# Patient Record
Sex: FEMALE | Race: WHITE | HISPANIC OR LATINO | Employment: FULL TIME | URBAN - METROPOLITAN AREA
[De-identification: names, ages, dates, MRNs, and addresses within clinical notes are randomized per-mention and may not be internally consistent; named-entity substitution may affect disease eponyms.]

---
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
## 2022-02-14 ENCOUNTER — HOSPITAL ENCOUNTER (EMERGENCY)
Facility: MEDICAL CENTER | Age: 22
End: 2022-02-14
Attending: EMERGENCY MEDICINE
Payer: COMMERCIAL

## 2022-02-14 VITALS
DIASTOLIC BLOOD PRESSURE: 65 MMHG | RESPIRATION RATE: 16 BRPM | BODY MASS INDEX: 22.79 KG/M2 | TEMPERATURE: 97.2 F | SYSTOLIC BLOOD PRESSURE: 120 MMHG | HEART RATE: 99 BPM | WEIGHT: 153.88 LBS | OXYGEN SATURATION: 97 % | HEIGHT: 69 IN

## 2022-02-14 DIAGNOSIS — J03.90 EXUDATIVE TONSILLITIS: ICD-10-CM

## 2022-02-14 PROCEDURE — 99282 EMERGENCY DEPT VISIT SF MDM: CPT

## 2022-02-14 RX ORDER — METHYLPREDNISOLONE 4 MG/1
TABLET ORAL
Qty: 1 EACH | Refills: 0 | Status: SHIPPED | OUTPATIENT
Start: 2022-02-14 | End: 2024-02-09

## 2022-02-15 NOTE — ED PROVIDER NOTES
"ED Provider Note    CHIEF COMPLAINT  Chief Complaint   Patient presents with   • Sore Throat     Pt diagnosed with tonsilitis 1 week ago. Pt says it has gotten worse on both sides. White sores noted in throat. Painful to swallow. Pt feels feverish, nauseous, body aches.        HPI  Meche Tadeo is a 21 y.o. female who presents for evaluation of a sore throat. 10 days ago she was diagnosed with an exudative tonsillitis, tested negative for strep and mono but was treated with a course of clindamycin. She also was treated with a course of tapered steroids. She states that 3 days into the course of steroids her symptoms have fully resolved. She finished antibiotics and now over the past day or 2 symptoms have returned. Feels subjectively febrile. No vomiting. No other acute complaints    REVIEW OF SYSTEMS  Negative for rash, vomiting.    PAST MEDICAL HISTORY       SOCIAL HISTORY  Social History     Tobacco Use   • Smoking status: Never Smoker   • Smokeless tobacco: Never Used   Substance and Sexual Activity   • Alcohol use: Yes     Comment: once a week   • Drug use: Never   • Sexual activity: Not on file       SURGICAL HISTORY  patient denies any surgical history    CURRENT MEDICATIONS  I personally reviewed the medication list in the charting documentation.     ALLERGIES  Allergies   Allergen Reactions   • Cephalexin      2003-11-13;urticaria 11-03   • Imitrex [Sumatriptan]        PHYSICAL EXAM  VITAL SIGNS: /75   Pulse 74   Temp 35.9 °C (96.6 °F) (Temporal)   Resp 16   Ht 1.753 m (5' 9\")   Wt 69.8 kg (153 lb 14.1 oz)   SpO2 95%   BMI 22.72 kg/m²   Constitutional: Well appearing patient in no acute distress.  Awake and alert, not toxic nor ill in appearance.  HENT: Inspection of the posterior pharynx reveals an exudative tonsillitis symmetrically. The uvula is midline. There is no obvious asymmetry, no peritonsillar or retropharyngeal abscess appreciated. No trismus. Palpation of the anterior neck " reveals very mild left-sided anterior cervical lymphadenopathy.  Neck: Comfortable movement without any obvious restriction in the range of motion.  Eyes: Conjunctiva normal, Non-icteric.   Chest: Normal nonlabored respirations.  Skin: The exposed portions of skin reveal no obvious rash or other abnormalities.  Musculoskeletal: No obvious restriction in the range of motion in all major joints.   Neurologic: Alert, No obvious focal deficits noted.   Psychiatric: Affect normal for clinical presentation    COURSE & MEDICAL DECISION MAKING  Pertinent Labs & Imaging studies reviewed. (See chart for details)    Encounter Summary: This is a very pleasant 21 y.o. female who unfortunately required evaluation in the emergency department today with a return of her exudative tonsillitis. Initially diagnosed 10 days ago, despite a negative strep swab she was treated with clindamycin and a Medrol Dosepak. She seemed to improve, actually had her symptoms resolved, a few days into the steroid pack, only to return again over the past couple days. On exam she has a symmetric exudative tonsillitis with no indication of peritonsillar nor retropharyngeal abscess. She looks well, no trismus. Will repeat the steroid Dosepak as that seemed to help significantly but no need to repeat the course of antibiotics. I will have her follow-up with ENT. She will be discharged in stable condition with strict return instructions provided      DISPOSITION: Discharge Home      FINAL IMPRESSION  1. Exudative tonsillitis        This dictation was created using voice recognition software. The accuracy of the dictation is limited to the abilities of the software. I expect there may be some errors of grammar and possibly content. The nursing notes were reviewed and certain aspects of this information were incorporated into this note.    Electronically signed by: Tesfaye Downey M.D., 2/14/2022 7:33 PM

## 2022-02-15 NOTE — ED TRIAGE NOTES
"Chief Complaint   Patient presents with   • Sore Throat     Pt diagnosed with tonsilitis 1 week ago. Pt says it has gotten worse on both sides. White sores noted in throat. Painful to swallow. Pt feels feverish, nauseous, body aches.      Pt completed clindamycin and prednisone prescription from previous visit. Pt from [place].    /75   Pulse 74   Temp 35.9 °C (96.6 °F) (Temporal)   Resp 16   Ht 1.753 m (5' 9\")   Wt 69.8 kg (153 lb 14.1 oz)   SpO2 95%   BMI 22.72 kg/m²     "

## 2023-11-07 ENCOUNTER — HOSPITAL ENCOUNTER (EMERGENCY)
Facility: MEDICAL CENTER | Age: 23
End: 2023-11-07
Attending: STUDENT IN AN ORGANIZED HEALTH CARE EDUCATION/TRAINING PROGRAM
Payer: COMMERCIAL

## 2023-11-07 VITALS
HEIGHT: 69 IN | SYSTOLIC BLOOD PRESSURE: 105 MMHG | TEMPERATURE: 97.7 F | RESPIRATION RATE: 20 BRPM | OXYGEN SATURATION: 96 % | WEIGHT: 171.3 LBS | BODY MASS INDEX: 25.37 KG/M2 | HEART RATE: 95 BPM | DIASTOLIC BLOOD PRESSURE: 60 MMHG

## 2023-11-07 DIAGNOSIS — J01.10 ACUTE NON-RECURRENT FRONTAL SINUSITIS: ICD-10-CM

## 2023-11-07 LAB
ALBUMIN SERPL BCP-MCNC: 4.2 G/DL (ref 3.2–4.9)
ALBUMIN/GLOB SERPL: 1.4 G/DL
ALP SERPL-CCNC: 34 U/L (ref 30–99)
ALT SERPL-CCNC: 21 U/L (ref 2–50)
ANION GAP SERPL CALC-SCNC: 11 MMOL/L (ref 7–16)
APPEARANCE UR: CLEAR
AST SERPL-CCNC: 30 U/L (ref 12–45)
BASOPHILS # BLD AUTO: 0.3 % (ref 0–1.8)
BASOPHILS # BLD: 0.02 K/UL (ref 0–0.12)
BILIRUB SERPL-MCNC: 0.3 MG/DL (ref 0.1–1.5)
BILIRUB UR QL STRIP.AUTO: NEGATIVE
BUN SERPL-MCNC: 10 MG/DL (ref 8–22)
CALCIUM ALBUM COR SERPL-MCNC: 8.8 MG/DL (ref 8.5–10.5)
CALCIUM SERPL-MCNC: 9 MG/DL (ref 8.5–10.5)
CHLORIDE SERPL-SCNC: 103 MMOL/L (ref 96–112)
CO2 SERPL-SCNC: 22 MMOL/L (ref 20–33)
COLOR UR: YELLOW
CREAT SERPL-MCNC: 0.75 MG/DL (ref 0.5–1.4)
EOSINOPHIL # BLD AUTO: 0.01 K/UL (ref 0–0.51)
EOSINOPHIL NFR BLD: 0.2 % (ref 0–6.9)
ERYTHROCYTE [DISTWIDTH] IN BLOOD BY AUTOMATED COUNT: 41.6 FL (ref 35.9–50)
FLUAV RNA SPEC QL NAA+PROBE: NEGATIVE
FLUBV RNA SPEC QL NAA+PROBE: NEGATIVE
GFR SERPLBLD CREATININE-BSD FMLA CKD-EPI: 116 ML/MIN/1.73 M 2
GLOBULIN SER CALC-MCNC: 2.9 G/DL (ref 1.9–3.5)
GLUCOSE SERPL-MCNC: 106 MG/DL (ref 65–99)
GLUCOSE UR STRIP.AUTO-MCNC: NEGATIVE MG/DL
HCG UR QL: NEGATIVE
HCT VFR BLD AUTO: 40.8 % (ref 37–47)
HETEROPH AB SER QL: NEGATIVE
HGB BLD-MCNC: 13.8 G/DL (ref 12–16)
IMM GRANULOCYTES # BLD AUTO: 0.02 K/UL (ref 0–0.11)
IMM GRANULOCYTES NFR BLD AUTO: 0.3 % (ref 0–0.9)
KETONES UR STRIP.AUTO-MCNC: ABNORMAL MG/DL
LEUKOCYTE ESTERASE UR QL STRIP.AUTO: NEGATIVE
LYMPHOCYTES # BLD AUTO: 1.03 K/UL (ref 1–4.8)
LYMPHOCYTES NFR BLD: 15.9 % (ref 22–41)
MCH RBC QN AUTO: 29.3 PG (ref 27–33)
MCHC RBC AUTO-ENTMCNC: 33.8 G/DL (ref 32.2–35.5)
MCV RBC AUTO: 86.6 FL (ref 81.4–97.8)
MICRO URNS: ABNORMAL
MONOCYTES # BLD AUTO: 0.51 K/UL (ref 0–0.85)
MONOCYTES NFR BLD AUTO: 7.9 % (ref 0–13.4)
NEUTROPHILS # BLD AUTO: 4.89 K/UL (ref 1.82–7.42)
NEUTROPHILS NFR BLD: 75.4 % (ref 44–72)
NITRITE UR QL STRIP.AUTO: NEGATIVE
NRBC # BLD AUTO: 0 K/UL
NRBC BLD-RTO: 0 /100 WBC (ref 0–0.2)
PH UR STRIP.AUTO: 8 [PH] (ref 5–8)
PLATELET # BLD AUTO: 218 K/UL (ref 164–446)
PMV BLD AUTO: 9.7 FL (ref 9–12.9)
POTASSIUM SERPL-SCNC: 4 MMOL/L (ref 3.6–5.5)
PROT SERPL-MCNC: 7.1 G/DL (ref 6–8.2)
PROT UR QL STRIP: NEGATIVE MG/DL
RBC # BLD AUTO: 4.71 M/UL (ref 4.2–5.4)
RBC UR QL AUTO: NEGATIVE
RSV RNA SPEC QL NAA+PROBE: NEGATIVE
S PYO DNA SPEC NAA+PROBE: NOT DETECTED
SARS-COV-2 RNA RESP QL NAA+PROBE: NOTDETECTED
SODIUM SERPL-SCNC: 136 MMOL/L (ref 135–145)
SP GR UR STRIP.AUTO: 1.03
UROBILINOGEN UR STRIP.AUTO-MCNC: 1 MG/DL
WBC # BLD AUTO: 6.5 K/UL (ref 4.8–10.8)

## 2023-11-07 PROCEDURE — 80053 COMPREHEN METABOLIC PANEL: CPT

## 2023-11-07 PROCEDURE — 36415 COLL VENOUS BLD VENIPUNCTURE: CPT

## 2023-11-07 PROCEDURE — 700102 HCHG RX REV CODE 250 W/ 637 OVERRIDE(OP): Performed by: STUDENT IN AN ORGANIZED HEALTH CARE EDUCATION/TRAINING PROGRAM

## 2023-11-07 PROCEDURE — 0241U HCHG SARS-COV-2 COVID-19 NFCT DS RESP RNA 4 TRGT ED POC: CPT

## 2023-11-07 PROCEDURE — 86308 HETEROPHILE ANTIBODY SCREEN: CPT

## 2023-11-07 PROCEDURE — A9270 NON-COVERED ITEM OR SERVICE: HCPCS | Performed by: STUDENT IN AN ORGANIZED HEALTH CARE EDUCATION/TRAINING PROGRAM

## 2023-11-07 PROCEDURE — C9803 HOPD COVID-19 SPEC COLLECT: HCPCS

## 2023-11-07 PROCEDURE — 99284 EMERGENCY DEPT VISIT MOD MDM: CPT

## 2023-11-07 PROCEDURE — 81003 URINALYSIS AUTO W/O SCOPE: CPT

## 2023-11-07 PROCEDURE — 87651 STREP A DNA AMP PROBE: CPT

## 2023-11-07 PROCEDURE — 81025 URINE PREGNANCY TEST: CPT

## 2023-11-07 PROCEDURE — 85025 COMPLETE CBC W/AUTO DIFF WBC: CPT

## 2023-11-07 RX ORDER — OXYMETAZOLINE HYDROCHLORIDE 0.05 G/100ML
2 SPRAY NASAL 2 TIMES DAILY
Qty: 15 ML | Refills: 0 | Status: SHIPPED | OUTPATIENT
Start: 2023-11-07 | End: 2023-11-10

## 2023-11-07 RX ORDER — CLINDAMYCIN HYDROCHLORIDE 300 MG/1
300 CAPSULE ORAL 3 TIMES DAILY
Qty: 15 CAPSULE | Refills: 0 | Status: ACTIVE | OUTPATIENT
Start: 2023-11-10 | End: 2023-11-15

## 2023-11-07 RX ORDER — IBUPROFEN 600 MG/1
600 TABLET ORAL ONCE
Status: COMPLETED | OUTPATIENT
Start: 2023-11-07 | End: 2023-11-07

## 2023-11-07 RX ADMIN — IBUPROFEN 600 MG: 600 TABLET, FILM COATED ORAL at 01:51

## 2023-11-07 ASSESSMENT — PAIN DESCRIPTION - PAIN TYPE: TYPE: ACUTE PAIN

## 2023-11-07 NOTE — ED NOTES
Discharge instructions given to pt. Pt verbalized understanding. All questions have been addressed. Discharged pt in stable condition.

## 2023-11-07 NOTE — ED PROVIDER NOTES
"  ER Provider Note    Scribed for Nora Varner M.D. by Geovany Galeas. 11/7/2023   1:20 AM    Primary Care Provider: Pcp Pt States None    CHIEF COMPLAINT  Chief Complaint   Patient presents with    Fever     C/o fever started yesterday.  + headache. States she was sick with cough and fever 4 days ago. Been taking ibuprofen and tylenol with no relief.          HPI/ROS    LIMITATION TO HISTORY   Select: : None    Meche Tadeo is a 23 y.o. female who presents to the ED for evaluation of fever onset two days ago. She was sick last week with cough and runny nose but was improving. Last night she got a fever but has not been able to manage. She has been taking Tylenol, Motrin and NyQuil at the appropriate dosage but it has not alleviated her diseases. She has not been coughing but has been fatigued and had a frontal headache. Her headache is described as \"pressure\". She denies any nausea, rash, diarrhea, vomiting or dysuria. She denies any chance for STI. She denies any major medical problems, or abdominal surgeries. She denies any history of UTI.     PAST MEDICAL HISTORY  History reviewed. No pertinent past medical history.    SURGICAL HISTORY  Past Surgical History:   Procedure Laterality Date    OTHER ORTHOPEDIC SURGERY Bilateral     hip surgeries       FAMILY HISTORY  History reviewed. No pertinent family history.    SOCIAL HISTORY   reports that she has never smoked. She has never used smokeless tobacco. She reports current alcohol use. She reports that she does not use drugs.    CURRENT MEDICATIONS  Discharge Medication List as of 11/7/2023  2:51 AM        CONTINUE these medications which have NOT CHANGED    Details   methylPREDNISolone (MEDROL DOSEPAK) 4 MG Tablet Therapy Pack Use as directed, Disp-1 Each, R-0, Normal      lidocaine (XYLOCAINE) 2 % Solution Take 5 mL by mouth as needed for Throat/Mouth Pain (q6hr PRN throat pain, ok to rinse and spit or swallow)., Disp-120 mL, R-0, Normal      fluticasone " "(FLONASE) 50 MCG/ACT nasal spray Administer 2 Sprays into affected nostril(S) every day., Disp-16 g, R-0, Normal             ALLERGIES  Allergies   Allergen Reactions    Cephalexin      2003-11-13;urticaria 11-03    Imitrex [Sumatriptan]         PHYSICAL EXAM  /68   Pulse 94   Temp (!) 39.2 °C (102.5 °F) (Oral)   Resp 20   Ht 1.753 m (5' 9\")   Wt 77.7 kg (171 lb 4.8 oz)   LMP 10/30/2023   SpO2 96%   BMI 25.30 kg/m²    General: Alert, oriented female sitting on gurney. Nontoxic appearing  Head: Normocephalic atraumatic. Left greater than right submandibular lymphadenopathy. Tenderness to palpation of frontal sinuses  HENT: Erythematous tonsils, trace exudates  Eyes: Extraocular motion intact  Neck: Supple, no rigidity  Cardiovascular: Regular rate and rhythm no murmurs rubs or gallops  Respiratory: Clear to auscultation bilaterally, equal chest rise and fall, no increased work of breathing  Abdomen: Soft nontender no guarding  Musculoskeletal: Warm and well perfused, no peripheral edema  Neuro: Alert, no focal deficits  Integumentary: No wounds or rashes    DIAGNOSTIC STUDIES    Labs:   Labs Reviewed   CBC WITH DIFFERENTIAL - Abnormal; Notable for the following components:       Result Value    Neutrophils-Polys 75.40 (*)     Lymphocytes 15.90 (*)     All other components within normal limits   COMP METABOLIC PANEL - Abnormal; Notable for the following components:    Glucose 106 (*)     All other components within normal limits   URINALYSIS - Abnormal; Notable for the following components:    Ketones Trace (*)     All other components within normal limits    Narrative:     Release to patient->Immediate   GROUP A STREP BY PCR    Narrative:     Release to patient->Immediate   HETEROPHILE AB SCREEN   HCG QUALITATIVE UR    Narrative:     Release to patient->Immediate   ESTIMATED GFR   POCT COV-2, FLU A/B, RSV BY PCR   POC COV-2, FLU A/B, RSV BY PCR     All labs reviewed by me.  Left shift, trace ketones, " urinalysis not consistent with infection, pregnancy negative, COVID flu influenza negative, Monospot negative, strep negative.    INITIAL ASSESSMENT COURSE AND PLAN  Care Narrative 23-year-old female presenting with frontal headache, fever, malaise, sore/dry throat, myalgias, had a recent upper respiratory infection which had resolved.    1:20 AM - Patient was evaluated at bedside. She presents for fever and headache, fever has not been controlled with Tylenol, Motrin and Nyquil. Ordered for lab work to evaluate. The patient will be medicated with Motrin 600 mg for her symptoms. Patient verbalizes understanding and support with my plan of care.  Differential diagnoses include but not limited to: Sinusitis, strep pharyngitis, mononucleosis, UTI, COVID flu influenza    ED Observation Status? No, this patient does not meet criteria for ED Observation.     2:46 AM - Patient was reevaluated at bedside. Discussed lab results with the patient and informed them that they are reassuring. Patient will now be discharged at this time. Discussed return precautions and plan for at home care.  Prescribed a watch and wait prescription for clindamycin for presumed bacterial sinusitis, patient is well-appearing at this time, and she was also prescribed Afrin, we discussed Aurea pot with distilled water, if not improving, she can start antibiotics in a couple of days.  She verbalized understanding and agreement with this plan of care was discharged home in no acute distress.  Patient verbalizes understanding and agreement to this plan of care.             DISPOSITION AND DISCUSSIONS    Discussion of management with other [facility] or appropriate source(s): None     Escalation of care considered, and ultimately not performed: diagnostic imaging.  Considered CT maxillofacial however do not think that this would significantly .    Barriers to care at this time, including but not limited to: Patient does not have established  PCP.       DISPOSITION:  Patient will be discharged home in stable condition.    FOLLOW UP:  No follow-up provider specified.    OUTPATIENT MEDICATIONS:  Discharge Medication List as of 11/7/2023  2:51 AM        START taking these medications    Details   oxymetazoline (AFRIN) 0.05 % Solution Administer 2 Sprays into affected nostril(S) 2 times a day for 3 days., Disp-15 mL, R-0, Normal      clindamycin (CLEOCIN) 300 MG Cap Take 1 Capsule by mouth 3 times a day for 5 days., Disp-15 Capsule, R-0, Normal           FINAL DIAGNOSIS  1. Acute non-recurrent frontal sinusitis      Geovany TORRES (Blaise), am scribing for, and in the presence of, Steven Varner M.D..    Electronically signed by: Geovany Galeas (Blaise), 11/7/2023    Steven TORRES M.D. personally performed the services described in this documentation, as scribed by Geovany Galeas in my presence, and it is both accurate and complete.      The note accurately reflects work and decisions made by me.  Steven Varner M.D.  11/7/2023  5:23 AM

## 2023-11-07 NOTE — DISCHARGE INSTRUCTIONS
Try to avoid taking the antibiotics.  If your symptoms are worsening after several days of sinus rinses, Afrin, you may take the antibiotics.  Return if you are vomiting, develop severe worsening pain, vision changes, facial weakness or numbness other new symptoms you find concerning otherwise follow-up with your primary care doctor in the next 2 to 3 days for reevaluation.

## 2023-11-07 NOTE — ED TRIAGE NOTES
Meche Tadeo  23 y.o.  female  Chief Complaint   Patient presents with    Fever     C/o fever started yesterday.  + headache. States she was sick with cough and fever 4 days ago. Been taking ibuprofen and tylenol with no relief.

## 2023-12-04 ENCOUNTER — HOSPITAL ENCOUNTER (OUTPATIENT)
Facility: MEDICAL CENTER | Age: 23
End: 2023-12-04
Attending: PREVENTIVE MEDICINE
Payer: COMMERCIAL

## 2023-12-04 ENCOUNTER — EMPLOYEE HEALTH (OUTPATIENT)
Dept: OCCUPATIONAL MEDICINE | Facility: CLINIC | Age: 23
End: 2023-12-04

## 2023-12-04 ENCOUNTER — EH NON-PROVIDER (OUTPATIENT)
Dept: OCCUPATIONAL MEDICINE | Facility: CLINIC | Age: 23
End: 2023-12-04

## 2023-12-04 DIAGNOSIS — Z02.1 ENCOUNTER FOR PRE-EMPLOYMENT HEALTH SCREENING EXAMINATION: ICD-10-CM

## 2023-12-04 DIAGNOSIS — Z02.1 PRE-EMPLOYMENT DRUG SCREENING: ICD-10-CM

## 2023-12-04 LAB
AMP AMPHETAMINE: NORMAL
BAR BARBITURATES: NORMAL
BZO BENZODIAZEPINES: NORMAL
COC COCAINE: NORMAL
INT CON NEG: NORMAL
INT CON POS: NORMAL
MDMA ECSTASY: NORMAL
MET METHAMPHETAMINES: NORMAL
MTD METHADONE: NORMAL
OPI OPIATES: NORMAL
OXY OXYCODONE: NORMAL
PCP PHENCYCLIDINE: NORMAL
POC URINE DRUG SCREEN OCDRS: NEGATIVE
THC: NORMAL

## 2023-12-04 PROCEDURE — 90686 IIV4 VACC NO PRSV 0.5 ML IM: CPT | Performed by: NURSE PRACTITIONER

## 2023-12-04 PROCEDURE — 8915 PR COMPREHENSIVE PHYSICAL: Performed by: PREVENTIVE MEDICINE

## 2023-12-04 PROCEDURE — 94375 RESPIRATORY FLOW VOLUME LOOP: CPT | Performed by: PREVENTIVE MEDICINE

## 2023-12-04 PROCEDURE — 86480 TB TEST CELL IMMUN MEASURE: CPT | Performed by: PREVENTIVE MEDICINE

## 2023-12-04 PROCEDURE — 80305 DRUG TEST PRSMV DIR OPT OBS: CPT | Performed by: PREVENTIVE MEDICINE

## 2023-12-06 LAB
GAMMA INTERFERON BACKGROUND BLD IA-ACNC: 0.05 IU/ML
M TB IFN-G BLD-IMP: NEGATIVE
M TB IFN-G CD4+ BCKGRND COR BLD-ACNC: 0 IU/ML
MITOGEN IGNF BCKGRD COR BLD-ACNC: >10 IU/ML
QFT TB2 - NIL TBQ2: 0 IU/ML

## 2024-02-02 ENCOUNTER — NON-PROVIDER VISIT (OUTPATIENT)
Dept: URGENT CARE | Facility: CLINIC | Age: 24
End: 2024-02-02
Payer: COMMERCIAL

## 2024-02-02 ENCOUNTER — OCCUPATIONAL MEDICINE (OUTPATIENT)
Dept: URGENT CARE | Facility: CLINIC | Age: 24
End: 2024-02-02
Payer: COMMERCIAL

## 2024-02-02 VITALS
SYSTOLIC BLOOD PRESSURE: 102 MMHG | TEMPERATURE: 97.5 F | RESPIRATION RATE: 16 BRPM | HEIGHT: 69 IN | BODY MASS INDEX: 24.45 KG/M2 | HEART RATE: 107 BPM | DIASTOLIC BLOOD PRESSURE: 62 MMHG | OXYGEN SATURATION: 96 % | WEIGHT: 165.1 LBS

## 2024-02-02 DIAGNOSIS — S39.011A STRAIN OF MUSCLE OF RIGHT GROIN REGION: ICD-10-CM

## 2024-02-02 DIAGNOSIS — Z02.1 PRE-EMPLOYMENT DRUG SCREENING: Primary | ICD-10-CM

## 2024-02-02 DIAGNOSIS — Z02.83 ENCOUNTER FOR EMPLOYMENT-RELATED DRUG TESTING: ICD-10-CM

## 2024-02-02 DIAGNOSIS — S76.212A INGUINAL STRAIN, LEFT, INITIAL ENCOUNTER: ICD-10-CM

## 2024-02-02 LAB
AMP AMPHETAMINE: NORMAL
BAR BARBITURATES: NORMAL
BREATH ALCOHOL COMMENT: NORMAL
BZO BENZODIAZEPINES: NORMAL
COC COCAINE: NORMAL
INT CON NEG: NEGATIVE
INT CON POS: POSITIVE
MDMA ECSTASY: NORMAL
MET METHAMPHETAMINES: NORMAL
MTD METHADONE: NORMAL
OPI OPIATES: NORMAL
OXY OXYCODONE: NORMAL
PCP PHENCYCLIDINE: NORMAL
POC BREATHALIZER: 0 PERCENT (ref 0–0.01)
POC URINE DRUG SCREEN OCDRS: NORMAL
THC: NORMAL

## 2024-02-02 PROCEDURE — 99213 OFFICE O/P EST LOW 20 MIN: CPT | Performed by: FAMILY MEDICINE

## 2024-02-02 PROCEDURE — 3074F SYST BP LT 130 MM HG: CPT | Performed by: FAMILY MEDICINE

## 2024-02-02 PROCEDURE — 3078F DIAST BP <80 MM HG: CPT | Performed by: FAMILY MEDICINE

## 2024-02-02 PROCEDURE — 80305 DRUG TEST PRSMV DIR OPT OBS: CPT | Performed by: FAMILY MEDICINE

## 2024-02-02 PROCEDURE — 82075 ASSAY OF BREATH ETHANOL: CPT | Performed by: FAMILY MEDICINE

## 2024-02-02 ASSESSMENT — FIBROSIS 4 INDEX: FIB4 SCORE: 0.63

## 2024-02-02 NOTE — PROGRESS NOTES
"Subjective:     Meche Tadeo is a 23 y.o. female who presents for Hip Injury and Back Pain (WC DOI 1/30/24 hip & back injury, room 3)      Bilateral groin muscle pain after moving a patient    PMH:   No pertinent past medical history to this problem  MEDS:  Medications were reviewed in EMR  ALLERGIES:  Allergies were reviewed in EMR  SOCHX:  Works as a   FH:   No pertinent family history to this problem       Objective:     /62 (BP Location: Left arm, Patient Position: Sitting, BP Cuff Size: Adult)   Pulse (!) 107   Temp 36.4 °C (97.5 °F)   Resp 16   Ht 1.753 m (5' 9\")   Wt 74.9 kg (165 lb 1.6 oz)   SpO2 96%   BMI 24.38 kg/m²     No vertebral pain, no pain in the hip joint.  Pains in the groin muscles, pain with straight leg raise bilaterally, pain with abduction and adduction of both legs.    Assessment/Plan:       1. Strain of muscle of right groin region    2. Inguinal strain, left, initial encounter    Released to Restricted Duty FROM 2/2/2024 TO 2/9/2024     Follow up one week, no lifting, stooping or climbing    Differential diagnosis, natural history, supportive care, and indications for immediate follow-up discussed.    "

## 2024-02-02 NOTE — LETTER
[facility] [address] [address]  Phone:  [phone] - Fax:  [phone]   [facility] Progress Report and Disability Certification  Date of Service: 2/2/2024   No Show:  No  Date / Time of Next Visit: 2/9/2024 @ 3 PM    Claim Information   Patient Name: Meche Tadeo  Claim Number:     Employer: [employer]  Date of Injury: 1/30/2024     Insurer / TPA: Esis  ID / SSN:     Occupation: [employer]  Diagnosis: Diagnoses of Strain of muscle of right groin region and Inguinal strain, left, initial encounter were pertinent to this visit.    Medical Information   Related to Industrial Injury? Yes    Subjective Complaints:  Bilateral groin muscle pain after moving a patient   Objective Findings: No vertebral pain, no pain in the hip joint.  Pains in the groin muscles, pain with straight leg raise bilaterally, pain with abduction and adduction of both legs.   Pre-Existing Condition(s):     Assessment:   Initial Visit    Status: Additional Care Required  Permanent Disability:No    Plan: Medication  Comments:naprosyn bid    Diagnostics:      Comments:  Follow up one week, no lifting, stooping or climbing    Disability Information   Status: Released to Restricted Duty    From:  2/2/2024  Through: 2/9/2024 Restrictions are: Temporary   Physical Restrictions   Sitting:    Standing:    Stooping:    Bending:      Squatting:    Walking:    Climbing:    Pushing:      Pulling:    Other:    Reaching Above Shoulder (L):   Reaching Above Shoulder (R):       Reaching Below Shoulder (L):    Reaching Below Shoulder (R):      Not to exceed Weight Limits   Carrying(hrs):   Weight Limit(lb):   Lifting(hrs):   Weight  Limit(lb):     Comments:      Repetitive Actions   Hands: i.e. Fine Manipulations from Grasping:     Feet: i.e. Operating Foot Controls:     Driving / Operate Machinery:     Health Care Provider’s Original or Electronic Signature  Stiven Palafox  M.D. Health Care Provider’s Original or Electronic Signature    Otilio Frias DO MPH     Clinic Name / Location: [address]  [place] [address] Clinic Phone Number: Dept: [phone]   Appointment Time: 1:45 Pm Visit Start Time: 2:04 PM   Check-In Time:  2:01 Pm Visit Discharge Time: 2:48 PM    Original-Treating Physician or Chiropractor    Page 2-Insurer/TPA    Page 3-Employer    Page 4-Employee

## 2024-02-02 NOTE — LETTER
"    EMPLOYEE’S CLAIM FOR COMPENSATION/ REPORT OF INITIAL TREATMENT  FORM C-4  PLEASE TYPE OR PRINT    EMPLOYEE’S CLAIM - PROVIDE ALL INFORMATION REQUESTED   First Name                    DAGO Mcgregor Last Name  Carlyle Birthdate                    2000                Sex  []M  []F Claim Number (Insurer’s Use Only)     Home Address  [address] Age  23 y.o. Height  1.753 m (5' 9\") Weight  74.9 kg (165 lb 1.6 oz) Social Security Number     [facility] Zip  [address] Telephone  [phone] (home)    Mailing Address  [address] [facility] Zip  [address] Primary Language Spoken  English    INSURER   THIRD-PARTY   Esis   Employee's Occupation (Job Title) When Injury or Occupational Disease Occurred  [employer]    Employer's Name/Company Name  [employer]  Telephone  [phone]    Office Mail Address (Number and Street)  [address] [place] [address]     Date of Injury (if applicable) 1/30/2024               Hours Injury (if applicable)  5:30 PM Date Employer Notified  1/30/2024 Last Day of Work after Injury or Occupational Disease  2/1/2024 Supervisor to Whom Injury     Reported  [facility]   Address or Location of Accident (if applicable)  Work [1]   What were you doing at the time of accident? (if applicable)  traNsportiNg a patieNt by bed    How did this injury or occupational disease occur? (Be specific and answer in detail. Use additional sheet if necessary)  While traNsportiNg a patieNt by bet to a New room as I was pulliNg the bed iNto the room I stepped aNd my upper body aNd hip twisted awkwardly. I felt paiN iN my groiN area aNd oN the back side of my hip that begaN to radiate towards my back.   If you believe that you have an occupational disease, when did you first have knowledge of the disability and its relationship to your employment?  N/A Witnesses to the Accident (if " applicable)  NoNe      Nature of Injury or Occupational Disease  Workers' Compensation  Part(s) of Body Injured or Affected  Hip (R) Lower Back Area (Lumbar Area & Lumbo-Sacral) Abdomen Including Groin    I CERTIFY THAT THE ABOVE IS TRUE AND CORRECT TO T HE BEST OF MY KNOWLEDGE AND THAT I HAVE PROVIDED THIS INFORMATION IN ORDER TO OBTAIN THE BENEFITS OF [place], OR [place]).  I HEREBY AUTHORIZE ANY PHYSICIAN, CHIROPRACTOR, SURGEON, PRACTITIONER OR ANY OTHER PERSON, ANY HOSPITAL, INCLUDING [facility] OR [facility], ANY  MEDICAL SERVICE ORGANIZATION, ANY INSURANCE COMPANY, OR OTHER INSTITUTION OR ORGANIZATION TO RELEASE TO EACH OTHER, ANY MEDICAL OR OTHER INFORMATION, INCLUDING BENEFITS PAID OR PAYABLE, PERTINENT TO THIS INJURY OR DISEASE, EXCEPT INFORMATION RELATIVE TO DIAGNOSIS, TREATMENT AND/OR COUNSELING FOR AIDS, PSYCHOLOGICAL CONDITIONS, ALCOHOL OR CONTROLLED SUBSTANCES, FOR WHICH I MUST GIVE SPECIFIC AUTHORIZATION.  A PHOTOSTAT OF THIS AUTHORIZATION SHALL BE VALID AS THE ORIGINAL.     Date 02/02/2024   [facility]  Employee’s Original or  *Electronic Signature   THIS REPORT MUST BE COMPLETED AND MAILED WITHIN 3 WORKING DAYS OF TREATMENT   [facility]    Name of Facility  [facility]   Date 2/2/2024 Diagnosis and Description of Injury or Occupational Disease  (S39.011A) Strain of muscle of right groin region  (S76.212A) Inguinal strain, left, initial encounter  Diagnoses of Strain of muscle of right groin region and Inguinal strain, left, initial encounter were pertinent to this visit. Is there evidence that the injured employee was under the influence of alcohol and/or another controlled substance at the time of accident?  []No  [] Yes (if yes, please explain)   Hour 2:04 PM  No   Treatment: Rest, anti inflamatory    Have you advised the patient to remain off work five days or  more?   [] Yes Indicate dates: From   To    []No If no, is the injured employee capable of: [] full duty [] modified duty                                                             No  Yes  If modified duty, specify any limitations / restrictions:  No patient lifting or pushing, stooping                                                                                                                                                                                                                                                                                                                                                                                                               X-Ray Findings:      From information given by the employee, together with medical evidence, can you directly connect this injury or occupational disease as job incurred?  []Yes   [] No Yes    Is additional medical care by a physician indicated? []Yes [] No  Yes    Do you know of any previous injury or disease contributing to this condition or occupational disease? []Yes [] No (Explain if yes)                          Yes  Comments:Patient has had bilateral hip surgery, and has injured groin muscle with athletics in the past   Date  2/2/2024 Print Health Care Provider’s Name  Manny Palafox M.D. I certify that the employer’s copy of  this form was delivered to the employer on:   Address  [address] [place] [address] INSURER'S USE ONLY                       [facility]  [address] Provider’s Tax ID Number  [id number]   Telephone  Dept: [phone]    Health Care Provider’s Original or Electronic Signature  e-SignMANNY PALAFOX M.D. Degree (MD,DO, DC,PA-C,APRN)  MD  Choose (if applicable)      ORIGINAL - TREATING HEALTHCARE PROVIDER PAGE 2 - INSURER/TPA PAGE 3 - EMPLOYER PAGE 4 - EMPLOYEE             Form C-4 (rev.08/23)        BRIEF DESCRIPTION OF RIGHTS AND BENEFITS  (Pursuant to [place] 616C.050)    Notice of Injury or Occupational  "Disease (Incident Report Form C-1): If an injury or occupational disease (OD) arises out of and in the course of employment, you must provide written notice to your employer as soon as practicable, but no later than 7 days after the accident or OD. Your employer shall maintain a sufficient supply of the required forms.    Claim for Compensation (Form C-4): If medical treatment is sought, the form C-4 is available at the place of initial treatment. A completed \"Claim for Compensation\" (Form C-4) must be filed within 90 days after an accident or OD. The treating physician or chiropractor must, within 3 working days after treatment, complete and mail to the employer, the employer's insurer and third-party , the Claim for Compensation.    Medical Treatment: If you require medical treatment for your on-the-job injury or OD, you may be required to select a physician or chiropractor from a list provided by your workers’ compensation insurer, if it has contracted with an Organization for Managed Care (MCO) or Preferred Provider Organization (PPO) or providers of health care. If your employer has not entered into a contract with an MCO or PPO, you may select a physician or chiropractor from the Panel of Physicians and Chiropractors. Any medical costs related to your industrial injury or OD will be paid by your insurer.    Temporary Total Disability (TTD): If your doctor has certified that you are unable to work for a period of at least 5 consecutive days, or 5 cumulative days in a 20-day period, or places restrictions on you that your employer does not accommodate, you may be entitled to TTD compensation.    Temporary Partial Disability (TPD): If the wage you receive upon reemployment is less than the compensation for TTD to which you are entitled, the insurer may be required to pay you TPD compensation to make up the difference. TPD can only be paid for a maximum of 24 months.    Permanent Partial Disability " (PPD): When your medical condition is stable and there is an indication of a PPD as a result of your injury or OD, within 30 days, your insurer must arrange for an evaluation by a rating physician or chiropractor to determine the degree of your PPD. The amount of your PPD award depends on the date of injury, the results of the PPD evaluation, your age and wage.    Permanent Total Disability (PTD): If you are medically certified by a treating physician or chiropractor as permanently and totally disabled and have been granted a PTD status by your insurer, you are entitled to receive monthly benefits not to exceed 66 2/3% of your average monthly wage. The amount of your PTD payments is subject to reduction if you previously received a lump-sum PPD award.    Vocational Rehabilitation Services: You may be eligible for vocational rehabilitation services if you are unable to return to the job due to a permanent physical impairment or permanent restrictions as a result of your injury or occupational disease.    Transportation and Per Sherrell Reimbursement: You may be eligible for travel expenses and per sherrell associated with medical treatment.    Reopening: You may be able to reopen your claim if your condition worsens after claim closure.     Appeal Process: If you disagree with a written determination issued by the insurer or the insurer does not respond to your request, you may appeal to the Department of Administration, , by following the instructions contained in your determination letter. You must appeal the determination within 70 days from the date of the determination letter at [address], [place] [address], or [address], [place] [address]. If you disagree with the  decision, you may appeal to the Department of Administration, . You must file your appeal within 30 days from the date of the  decision  letter at [address], [place], [place] [address], or [address], [address], [place], [place] [address]. If you disagree with a decision of an , you may file a petition for judicial review with the District Court. You must do so within 30 days of the Appeal Officer’s decision. You may be represented by an  at your own expense or you may contact the [facility] for possible representation.    [place]  for Injured Workers (NAIW): If you disagree with a  decision, you may request that NAIW represent you without charge at an  Hearing. For information regarding denial of benefits, you may contact the [facility] at: [address], [address], [place] [address], [phone], or [address], [place] [address], [phone]    To File a Complaint with the Division: If you wish to file a complaint with the  of the Division of Industrial Relations (DIR),  please contact the Workers’ Compensation Section, [address], [address], [place], [place] [address], telephone [phone]) [phone], or [address], [place], [place] [address], telephone [phone].    For assistance with Workers’ Compensation Issues: You may contact the [facility] Office for Consumer Health Assistance, [address], [place], [place] [address], Toll Free 1-663.788.9802, Web site: http://[facility].[place].gov/Programs/TED E-mail: ted@[facility].nv.gov              __________________________________________________________________                                    _________________            Employee Name / Signature                                                                                                                            Date                                                                                                                                                                                                                               D-2 (rev. 10/20)

## 2024-02-09 ENCOUNTER — APPOINTMENT (OUTPATIENT)
Dept: RADIOLOGY | Facility: IMAGING CENTER | Age: 24
End: 2024-02-09
Attending: PHYSICIAN ASSISTANT
Payer: COMMERCIAL

## 2024-02-09 ENCOUNTER — OCCUPATIONAL MEDICINE (OUTPATIENT)
Dept: URGENT CARE | Facility: CLINIC | Age: 24
End: 2024-02-09
Payer: COMMERCIAL

## 2024-02-09 VITALS
BODY MASS INDEX: 24.19 KG/M2 | TEMPERATURE: 97.1 F | HEIGHT: 69 IN | RESPIRATION RATE: 14 BRPM | SYSTOLIC BLOOD PRESSURE: 98 MMHG | WEIGHT: 163.3 LBS | HEART RATE: 74 BPM | DIASTOLIC BLOOD PRESSURE: 62 MMHG | OXYGEN SATURATION: 98 %

## 2024-02-09 DIAGNOSIS — S76.011D STRAIN OF RIGHT HIP, SUBSEQUENT ENCOUNTER: ICD-10-CM

## 2024-02-09 PROCEDURE — 73501 X-RAY EXAM HIP UNI 1 VIEW: CPT | Mod: TC,RT | Performed by: RADIOLOGY

## 2024-02-09 PROCEDURE — 3074F SYST BP LT 130 MM HG: CPT | Performed by: PHYSICIAN ASSISTANT

## 2024-02-09 PROCEDURE — 3078F DIAST BP <80 MM HG: CPT | Performed by: PHYSICIAN ASSISTANT

## 2024-02-09 PROCEDURE — 99214 OFFICE O/P EST MOD 30 MIN: CPT | Performed by: PHYSICIAN ASSISTANT

## 2024-02-09 RX ORDER — METHYLPREDNISOLONE 4 MG/1
TABLET ORAL
Qty: 21 TABLET | Refills: 0 | Status: SHIPPED | OUTPATIENT
Start: 2024-02-09

## 2024-02-09 ASSESSMENT — FIBROSIS 4 INDEX: FIB4 SCORE: 0.63

## 2024-02-09 NOTE — LETTER
[facility] [address] [address]  Phone:  [phone] - Fax:  [phone]   [facility] Progress Report and Disability Certification  Date of Service: 2024   No Show:  No  Date / Time of Next Visit: 2024 6:00 PM   Claim Information   Patient Name: Meche Tadeo  Claim Number:     Employer: [employer]  Date of Injury: 2024     Insurer / TPA: Esis  ID / SSN:     Occupation: [employer]  Diagnosis: The encounter diagnosis was Strain of right hip, subsequent encounter.    Medical Information   Related to Industrial Injury? Yes    Subjective Complaints:  DOI: 2024.  Second visit to urgent care.  Right hip strain.  She reports no improvement in her symptoms.  Still having lateral hip pain radiating to the anterior hip.  There is some grinding and crepitus with decreased range of motion.  Pain with weightbearing.  She is using OTC meds with limited relief.  Patient is tolerating work restrictions   Objective Findings: Tenderness of the anterior and lateral right hip.  Pain with internal and external rotation.  Some crepitus noted.  Lower extremity strength and gait equal.  No significant lumbar pain.   Pre-Existing Condition(s):     Assessment:   Condition Same    Status: Additional Care Required  Permanent Disability:No    Plan: Medication    Diagnostics: X-ray    Comments:       Disability Information   Status: Released to Restricted Duty    From:  2024  Through: 2024 Restrictions are: Temporary   Physical Restrictions   Sitting:    Standing:    Stoopin hrs/day Bending:      Squattin hrs/day Walking:  < or = to 6 hrs/day Climbing:    Pushin hrs/day   Pullin hrs/day Other:    Reaching Above Shoulder (L):   Reaching Above Shoulder (R):       Reaching Below Shoulder (L):    Reaching Below Shoulder (R):      Not to exceed Weight Limits   Carrying(hrs):   Weight Limit(lb): < or = to 10 pounds  Lifting(hrs):   Weight  Limit(lb): < or = to 10 pounds   Comments:      Repetitive Actions   Hands: i.e. Fine Manipulations from Grasping:     Feet: i.e. Operating Foot Controls:     Driving / Operate Machinery:     Health Care Provider’s Original or Electronic Signature  Rodolfo Whittaker P.A.-C. Health Care Provider’s Original or Electronic Signature    Otilio Frias DO MPH     Clinic Name / Location: [place] [address]  [place] [address] Clinic Phone Number: Dept: [phone]   Appointment Time: 3:00 Pm Visit Start Time: 3:14 PM   Check-In Time:  2:47 Pm Visit Discharge Time:  4:11 PM   Original-Treating Physician or Chiropractor    Page 2-Insurer/TPA    Page 3-Employer    Page 4-Employee

## 2024-02-09 NOTE — LETTER
[facility] [address] [place] [address]  Phone:  [phone] - Fax:  [phone]   [facility] Progress Report and Disability Certification  Date of Service: 2024   No Show:  No  Date / Time of Next Visit: 2024   Claim Information   Patient Name: Meche Tadeo  Claim Number:     Employer: [employer] *** Date of Injury: 2024     Insurer / TPA: Esis *** ID / SSN:     Occupation: [employer] *** Diagnosis: The encounter diagnosis was Strain of right hip, subsequent encounter.    Medical Information   Related to Industrial Injury? Yes ***   Subjective Complaints:  DOI: 2024.  Second visit to urgent care.  Right hip strain.  She reports no improvement in her symptoms.  Still having lateral hip pain radiating to the anterior hip.  There is some grinding and crepitus with decreased range of motion.  Pain with weightbearing.  She is using OTC meds with limited relief.  Patient is tolerating work restrictions   Objective Findings: Tenderness of the anterior and lateral right hip.  Pain with internal and external rotation.  Some crepitus noted.  Lower extremity strength and gait equal.  No significant lumbar pain.   Pre-Existing Condition(s):     Assessment:   Condition Same    Status: Additional Care Required  Permanent Disability:No    Plan: Medication    Diagnostics: X-ray    Comments:       Disability Information   Status: Released to Restricted Duty    From:  2024  Through: 2024 Restrictions are:     Physical Restrictions   Sitting:    Standing:    Stoopin hrs/day Bending:      Squattin hrs/day Walking:  < or = to 6 hrs/day Climbing:    Pushin hrs/day   Pullin hrs/day Other:    Reaching Above Shoulder (L):   Reaching Above Shoulder (R):       Reaching Below Shoulder (L):    Reaching Below Shoulder (R):      Not to exceed Weight Limits   Carrying(hrs):   Weight Limit(lb): < or = to 10 pounds  Lifting(hrs):   Weight  Limit(lb): < or = to 10 pounds   Comments:      Repetitive Actions   Hands: i.e. Fine Manipulations from Grasping:     Feet: i.e. Operating Foot Controls:     Driving / Operate Machinery:     Health Care Provider’s Original or Electronic Signature  Rodolfo Whittaker P.A.-C. Health Care Provider’s Original or Electronic Signature    Otilio Frias DO MPH     Clinic Name / Location: [place] [address]  [place] [address] Clinic Phone Number: Dept: [phone]   Appointment Time: 3:00 Pm Visit Start Time: 3:14 PM   Check-In Time:  2:47 Pm Visit Discharge Time:  ***   Original-Treating Physician or Chiropractor    Page 2-Insurer/TPA    Page 3-Employer    Page 4-Employee

## 2024-02-10 NOTE — PROGRESS NOTES
"Subjective     Meche Tadeo is a 23 y.o. female who presents with Follow-Up (W/C f/v, pt notes no improvement with injury)      DOI: 1/30/2024.  Second visit to urgent care.  Right hip strain.  She reports no improvement in her symptoms.  Still having lateral hip pain radiating to the anterior hip.  There is some grinding and crepitus with decreased range of motion.  Pain with weightbearing.  She is using OTC meds with limited relief.  Patient is tolerating work restrictions     HPI    ROS           Objective     BP 98/62 (BP Location: Left arm, Patient Position: Sitting, BP Cuff Size: Adult)   Pulse 74   Temp 36.2 °C (97.1 °F) (Temporal)   Resp 14   Ht 1.753 m (5' 9\")   Wt 74.1 kg (163 lb 4.8 oz)   SpO2 98%   BMI 24.12 kg/m²      Physical Exam    Tenderness of the anterior and lateral right hip.  Pain with internal and external rotation.  Some crepitus noted.  Lower extremity strength and gait equal.  No significant lumbar pain.                   Assessment & Plan        1. Strain of right hip, subsequent encounter  DX-HIP-UNILATERAL-WITH PELVIS-1 VIEW RIGHT    methylPREDNISolone (MEDROL DOSEPAK) 4 MG Tablet Therapy Pack          X-ray ordered due to worsening of symptoms which was negative for acute findings.  She does have a significant bilateral pertinent past hip history.  She will be treated for acute strain with OTC meds conservative measures and Medrol Dosepak.  Work restrictions per D39.  She will follow-up when she returns home from out of state trip      Please note that this dictation was created using voice recognition software. I have made every reasonable attempt to correct obvious errors, but I expect that there are errors of grammar and possibly content that I did not discover before finalizing the note.  .           "

## 2024-02-20 ENCOUNTER — OCCUPATIONAL MEDICINE (OUTPATIENT)
Dept: URGENT CARE | Facility: CLINIC | Age: 24
End: 2024-02-20
Payer: COMMERCIAL

## 2024-02-20 VITALS
HEIGHT: 69 IN | BODY MASS INDEX: 24.38 KG/M2 | TEMPERATURE: 98.6 F | SYSTOLIC BLOOD PRESSURE: 114 MMHG | DIASTOLIC BLOOD PRESSURE: 62 MMHG | HEART RATE: 96 BPM | WEIGHT: 164.6 LBS | OXYGEN SATURATION: 95 % | RESPIRATION RATE: 22 BRPM

## 2024-02-20 DIAGNOSIS — S76.011D STRAIN OF RIGHT HIP, SUBSEQUENT ENCOUNTER: ICD-10-CM

## 2024-02-20 PROCEDURE — 3074F SYST BP LT 130 MM HG: CPT | Performed by: NURSE PRACTITIONER

## 2024-02-20 PROCEDURE — 99213 OFFICE O/P EST LOW 20 MIN: CPT | Performed by: NURSE PRACTITIONER

## 2024-02-20 PROCEDURE — 3078F DIAST BP <80 MM HG: CPT | Performed by: NURSE PRACTITIONER

## 2024-02-20 ASSESSMENT — FIBROSIS 4 INDEX: FIB4 SCORE: 0.63

## 2024-02-20 NOTE — LETTER
[facility] [place] [id number] [facility] [place] [address]  Phone:  [phone] - Fax:  [phone]   [facility] Progress Report and Disability Certification  Date of Service: 2/20/2024   No Show:  No  Date / Time of Next Visit: 2/28/24   Claim Information   Patient Name: Meche Tadeo  Claim Number:     Employer: [employer]  Date of Injury: 1/30/2024     Insurer / TPA: [employer]  ID / SSN:     Occupation: [employer]  Diagnosis: The encounter diagnosis was Strain of right hip, subsequent encounter.    Medical Information   Related to Industrial Injury? Yes    Subjective Complaints:  DOI: 1/30/2024.  Third visit to urgent care.  Right hip strain.  Patient returns the clinic having improvement in her symptoms.  Patient has been off work for the past week which she does feel was beneficial.  Still does continue to have pain with external rotation of the hip.  Has not been requiring any pain relieving medications.  Patient feels ready to trial full duty.   Objective Findings: Right hip: No gross deformities skin without erythema, no edema, no ecchymosis., no bony tenderness. +AROM with pain with external rotation.  DTRs +2.  Gait normal.    Pre-Existing Condition(s):     Assessment:   Condition Improved    Status: Additional Care Required  Permanent Disability:No    Plan:      Diagnostics:      Comments:       Disability Information   Status: Released to Full Duty    From:  2/20/2024  Through: 2/27/2024 Restrictions are:     Physical Restrictions   Sitting:    Standing:    Stooping:    Bending:      Squatting:    Walking:    Climbing:    Pushing:      Pulling:    Other:    Reaching Above Shoulder (L):   Reaching Above Shoulder (R):       Reaching Below Shoulder (L):    Reaching Below Shoulder (R):      Not to exceed Weight Limits   Carrying(hrs):   Weight Limit(lb):   Lifting(hrs):   Weight  Limit(lb):     Comments: Patient will be released to full duty without  restrictions.  Encouraged continued rest, ice, Tyle Motrin as needed for pain.  Follow-up in 1 week for reevaluation    Repetitive Actions   Hands: i.e. Fine Manipulations from Grasping:     Feet: i.e. Operating Foot Controls:     Driving / Operate Machinery:     Health Care Provider’s Original or Electronic Signature  LUIS ANGEL Davis Health Care Provider’s Original or Electronic Signature    Otilio Frias DO MPH     Clinic Name / Location: [place] [address]  [place] [address] Clinic Phone Number: Dept: [phone]   Appointment Time: 6:00 Pm Visit Start Time: 6:14 PM   Check-In Time:  6:05 Pm Visit Discharge Time:  6:25 PM   Original-Treating Physician or Chiropractor    Page 2-Insurer/TPA    Page 3-Employer    Page 4-Employee

## 2024-02-21 NOTE — PROGRESS NOTES
"Subjective:   Meche Tadeo  is a 23 y.o. female who presents for Follow-Up ( FU)    DOI: 1/30/2024.  Third visit to urgent care.  Right hip strain.  Patient returns the clinic having improvement in her symptoms.  Patient has been off work for the past week which she does feel was beneficial.  Still does continue to have pain with external rotation of the hip.  Has not been requiring any pain relieving medications.  Patient feels ready to trial full duty.   HPI  Review of Systems   Musculoskeletal:  Positive for joint pain.     Allergies   Allergen Reactions    Cephalexin      2003-11-13;urticaria 11-03    Imitrex [Sumatriptan]       Objective:   /62   Pulse 96   Temp 37 °C (98.6 °F) (Temporal)   Resp (!) 22   Ht 1.753 m (5' 9\")   Wt 74.7 kg (164 lb 9.6 oz)   SpO2 95%   BMI 24.31 kg/m²   Physical Exam  Constitutional:       Appearance: Normal appearance. She is not ill-appearing or toxic-appearing.   HENT:      Head: Normocephalic.      Right Ear: External ear normal.      Left Ear: External ear normal.      Nose: Nose normal.      Mouth/Throat:      Lips: Pink.   Eyes:      General: Lids are normal.   Pulmonary:      Effort: Pulmonary effort is normal. No accessory muscle usage.   Musculoskeletal:      Cervical back: Full passive range of motion without pain.      Right hip: Tenderness present. No deformity or bony tenderness. Normal range of motion. Normal strength.   Neurological:      Mental Status: She is alert and oriented to person, place, and time.   Psychiatric:         Mood and Affect: Mood normal.         Thought Content: Thought content normal.       Right hip: No gross deformities skin without erythema, no edema, no ecchymosis., no bony tenderness. +AROM with pain with external rotation.  DTRs +2.  Gait normal.    Assessment/Plan:   1. Strain of right hip, subsequent encounter  Patient will be released to full duty without restrictions.  Encouraged continued rest, ice, Tyle Motrin " as needed for pain.  Follow-up in 1 week for reevaluation  Differential diagnosis, natural history, supportive care, and indications for immediate follow-up discussed.

## 2024-02-28 ENCOUNTER — OCCUPATIONAL MEDICINE (OUTPATIENT)
Dept: URGENT CARE | Facility: CLINIC | Age: 24
End: 2024-02-28
Payer: COMMERCIAL

## 2024-02-28 VITALS
RESPIRATION RATE: 12 BRPM | TEMPERATURE: 97.3 F | WEIGHT: 165 LBS | BODY MASS INDEX: 24.44 KG/M2 | HEART RATE: 74 BPM | SYSTOLIC BLOOD PRESSURE: 112 MMHG | HEIGHT: 69 IN | OXYGEN SATURATION: 97 % | DIASTOLIC BLOOD PRESSURE: 52 MMHG

## 2024-02-28 DIAGNOSIS — M54.50 ACUTE LEFT-SIDED LOW BACK PAIN WITHOUT SCIATICA: ICD-10-CM

## 2024-02-28 PROCEDURE — 3078F DIAST BP <80 MM HG: CPT | Performed by: FAMILY MEDICINE

## 2024-02-28 PROCEDURE — 3074F SYST BP LT 130 MM HG: CPT | Performed by: FAMILY MEDICINE

## 2024-02-28 PROCEDURE — 99214 OFFICE O/P EST MOD 30 MIN: CPT | Performed by: FAMILY MEDICINE

## 2024-02-28 ASSESSMENT — FIBROSIS 4 INDEX: FIB4 SCORE: 0.63

## 2024-02-28 NOTE — PROGRESS NOTES
"Subjective:     Meche Tadeo is a 23 y.o. female who presents for Follow-Up (Workers comp )      Complains of tightness in back and right hip, states she is back to her regular duty, but pain has not gone away    PMH:   No pertinent past medical history to this problem  MEDS:  Medications were reviewed in EMR  ALLERGIES:  Allergies were reviewed in EMR  SOCHX:  Works as a   FH:   No pertinent family history to this problem       Objective:     /52 (BP Location: Left arm, Patient Position: Sitting)   Pulse 74   Temp 36.3 °C (97.3 °F) (Temporal)   Resp 12   Ht 1.753 m (5' 9\")   Wt 74.8 kg (165 lb)   SpO2 97%   BMI 24.37 kg/m²     No vertebral pain, normal straight leg raise bilaterally, 5/5 strength, no sensory loss.  Rom normal but complains of low back pain and tightness with bending backwards    Assessment/Plan:       1. Acute left-sided low back pain without sciatica  - Referral to Occupational Medicine    Released to Full Duty FROM   TO    Transfer care to Occupational Medicine  Transfer care and follow up to Occupational Medicine for continual back tightness and pain    Differential diagnosis, natural history, supportive care, and indications for immediate follow-up discussed.    "

## 2024-02-28 NOTE — LETTER
[facility] [address] [place] [address]  Phone:  [phone] - Fax:  [phone]   [facility] Progress Report and Disability Certification  Date of Service: 2/28/2024   No Show:  No  Date / Time of Next Visit:     Claim Information   Patient Name: Meche Tadeo  Claim Number:     Employer: [employer]  Date of Injury: 1/30/2024     Insurer / TPA: [employer]  ID / SSN: [id number]    Occupation: [employer]  Diagnosis: The encounter diagnosis was Acute left-sided low back pain without sciatica.    Medical Information   Related to Industrial Injury? Yes    Subjective Complaints:  Complains of tightness in back and right hip, states she is back to her regular duty, but pain has not gone away   Objective Findings: No vertebral pain, normal straight leg raise bilaterally, 5/5 strength, no sensory loss.  Rom normal but complains of low back pain and tightness with bending backwards   Pre-Existing Condition(s):     Assessment:   Condition Improved    Status: Additional Care Required  Permanent Disability:No    Plan:   Comments:refer to OT, for therapy and care    Diagnostics:      Comments:  Transfer care and follow up to Occupational Medicine for continual back tightness and pain    Disability Information   Status: Released to Full Duty    From:     Through:   Restrictions are:     Physical Restrictions   Sitting:    Standing:    Stooping:    Bending:      Squatting:    Walking:    Climbing:    Pushing:      Pulling:    Other:    Reaching Above Shoulder (L):   Reaching Above Shoulder (R):       Reaching Below Shoulder (L):    Reaching Below Shoulder (R):      Not to exceed Weight Limits   Carrying(hrs):   Weight Limit(lb):   Lifting(hrs):   Weight  Limit(lb):     Comments: Transfer care to Occupational Medicine    Repetitive Actions   Hands: i.e. Fine Manipulations from Grasping:     Feet: i.e. Operating Foot Controls:     Driving / Operate Machinery:     Health  Care Provider’s Original or Electronic Signature  Stiven Palafox M.D. Health Care Provider’s Original or Electronic Signature    Otilio Frias DO MPH     Clinic Name / Location: [place] [address]  [place] [address] Clinic Phone Number: Dept: [phone]   Appointment Time: 10:00 Am Visit Start Time: 10:17 AM   Check-In Time:  10:08 Am Visit Discharge Time:    Original-Treating Physician or Chiropractor    Page 2-Insurer/TPA    Page 3-Employer    Page 4-Employee

## 2024-02-28 NOTE — LETTER
[facility] [address] [place] [address]  Phone:  [phone] - Fax:  [phone]   [facility] Progress Report and Disability Certification  Date of Service: 2/28/2024   No Show:  No  Date / Time of Next Visit:     Claim Information   Patient Name: Meche Tadeo  Claim Number:     Employer: [employer]  Date of Injury: 1/30/2024     Insurer / TPA: [employer]  ID / SSN:     Occupation: [employer]  Diagnosis: The encounter diagnosis was Acute left-sided low back pain without sciatica.    Medical Information   Related to Industrial Injury? Yes    Subjective Complaints:  Complains of tightness in back and right hip, states she is back to her regular duty, but pain has not gone away   Objective Findings: No vertebral pain, normal straight leg raise bilaterally, 5/5 strength, no sensory loss.  Rom normal but complains of low back pain and tightness with bending backwards   Pre-Existing Condition(s):     Assessment:   Condition Improved    Status: Additional Care Required  Permanent Disability:No    Plan:   Comments:refer to OT, for therapy and care    Diagnostics:      Comments:  Transfer care and follow up to Occupational Medicine for continual back tightness and pain    Disability Information   Status: Released to Full Duty    From:     Through:   Restrictions are:     Physical Restrictions   Sitting:    Standing:    Stooping:    Bending:      Squatting:    Walking:    Climbing:    Pushing:      Pulling:    Other:    Reaching Above Shoulder (L):   Reaching Above Shoulder (R):       Reaching Below Shoulder (L):    Reaching Below Shoulder (R):      Not to exceed Weight Limits   Carrying(hrs):   Weight Limit(lb):   Lifting(hrs):   Weight  Limit(lb):     Comments: Transfer care to Occupational Medicine    Repetitive Actions   Hands: i.e. Fine Manipulations from Grasping:     Feet: i.e. Operating Foot Controls:     Driving / Operate Machinery:     Health  Care Provider’s Original or Electronic Signature  Stiven Palafox M.D. Health Care Provider’s Original or Electronic Signature    Otilio Frias DO MPH     Clinic Name / Location: [place] [address]  [place] [address] Clinic Phone Number: Dept: [phone]   Appointment Time: 10:00 Am Visit Start Time: 10:17 AM   Check-In Time:  10:08 Am Visit Discharge Time:  10:44 AM   Original-Treating Physician or Chiropractor    Page 2-Insurer/TPA    Page 3-Employer    Page 4-Employee

## 2024-09-24 ENCOUNTER — IMMUNIZATION (OUTPATIENT)
Dept: OCCUPATIONAL MEDICINE | Facility: CLINIC | Age: 24
End: 2024-09-24

## 2024-09-24 DIAGNOSIS — Z23 NEED FOR VACCINATION: Primary | ICD-10-CM
